# Patient Record
Sex: FEMALE | URBAN - METROPOLITAN AREA
[De-identification: names, ages, dates, MRNs, and addresses within clinical notes are randomized per-mention and may not be internally consistent; named-entity substitution may affect disease eponyms.]

---

## 2021-01-22 ENCOUNTER — OFFICE VISIT (OUTPATIENT)
Dept: URBAN - METROPOLITAN AREA CLINIC 33 | Facility: CLINIC | Age: 51
End: 2021-01-22
Payer: COMMERCIAL

## 2021-01-22 DIAGNOSIS — D31.01 BENIGN NEOPLASM OF RIGHT CONJUNCTIVA: Primary | ICD-10-CM

## 2021-01-22 PROCEDURE — 99203 OFFICE O/P NEW LOW 30 MIN: CPT | Performed by: OPTOMETRIST

## 2021-01-22 ASSESSMENT — INTRAOCULAR PRESSURE
OS: 16
OD: 15

## 2021-01-22 NOTE — IMPRESSION/PLAN
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 Plan: Patient reports reddish growth to nasal conjunctiva since 1 year. Patient reports it fluctuates in size. Slit lamp finding shows dilated tortuous vessel formation to nasal conjunctiva. Refer to oculoplastics for further evaluation. Possible hemangioma vs. other growth. Patient denies pain but reports occasional mild irritation.

## 2021-10-11 ENCOUNTER — OFFICE VISIT (OUTPATIENT)
Dept: URBAN - METROPOLITAN AREA CLINIC 33 | Facility: CLINIC | Age: 51
End: 2021-10-11
Payer: COMMERCIAL

## 2021-10-11 DIAGNOSIS — H11.151 PINGUECULA, RIGHT EYE: Primary | ICD-10-CM

## 2021-10-11 PROCEDURE — 92004 COMPRE OPH EXAM NEW PT 1/>: CPT | Performed by: OPHTHALMOLOGY

## 2021-10-11 RX ORDER — OFLOXACIN 3 MG/ML
0.3 % SOLUTION/ DROPS OPHTHALMIC
Qty: 5 | Refills: 1 | Status: ACTIVE
Start: 2021-10-11

## 2021-10-11 RX ORDER — PREDNISOLONE ACETATE 10 MG/ML
1 % SUSPENSION/ DROPS OPHTHALMIC
Qty: 10 | Refills: 1 | Status: ACTIVE
Start: 2021-10-11

## 2021-10-11 ASSESSMENT — INTRAOCULAR PRESSURE
OD: 15
OS: 15

## 2021-10-11 NOTE — IMPRESSION/PLAN
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 Condition: quality of life issue. Symptoms: may improve with surgery. Plan: Discussed diagnosis in detail with patient. Discussed risks of progression. Discussed treatment options with patient. Surgical treatment is required. Surgical risks and benefits were discussed, explained and understood by patient. Patient elects to have surgery.  RL-2

## 2021-10-19 ENCOUNTER — SURGERY (OUTPATIENT)
Dept: URBAN - METROPOLITAN AREA SURGERY 15 | Facility: SURGERY | Age: 51
End: 2021-10-19
Payer: MEDICAID

## 2021-10-19 PROCEDURE — 65426 REMOVAL OF EYE LESION: CPT | Performed by: OPHTHALMOLOGY

## 2021-10-20 ENCOUNTER — POST-OPERATIVE VISIT (OUTPATIENT)
Dept: URBAN - METROPOLITAN AREA CLINIC 33 | Facility: CLINIC | Age: 51
End: 2021-10-20

## 2021-10-20 DIAGNOSIS — Z48.810 ENCOUNTER FOR SURGICAL AFTERCARE FOLLOWING SURGERY ON A SENSE ORGAN: Primary | ICD-10-CM

## 2021-10-20 PROCEDURE — 99024 POSTOP FOLLOW-UP VISIT: CPT | Performed by: OPTOMETRIST

## 2021-10-20 ASSESSMENT — INTRAOCULAR PRESSURE
OD: 16
OS: 16

## 2021-10-20 NOTE — IMPRESSION/PLAN
Impression: S/P Pinguecula with Graft OD - 1 Day. Encounter for surgical aftercare following surgery on a sense organ  Z48.810. Post operative instructions reviewed. Condition improving.  Plan: Continue Ofloxacin QID OD and Prednisolone QID OD